# Patient Record
Sex: FEMALE | Race: WHITE | Employment: STUDENT | URBAN - METROPOLITAN AREA
[De-identification: names, ages, dates, MRNs, and addresses within clinical notes are randomized per-mention and may not be internally consistent; named-entity substitution may affect disease eponyms.]

---
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
## 2020-06-17 ENCOUNTER — OFFICE VISIT (OUTPATIENT)
Dept: INTERNAL MEDICINE CLINIC | Facility: CLINIC | Age: 20
End: 2020-06-17
Payer: MEDICAID

## 2020-06-17 VITALS
BODY MASS INDEX: 27.79 KG/M2 | HEART RATE: 97 BPM | WEIGHT: 151 LBS | SYSTOLIC BLOOD PRESSURE: 115 MMHG | TEMPERATURE: 97 F | HEIGHT: 62 IN | DIASTOLIC BLOOD PRESSURE: 80 MMHG

## 2020-06-17 DIAGNOSIS — Z00.00 PE (PHYSICAL EXAM), ROUTINE: Primary | ICD-10-CM

## 2020-06-17 DIAGNOSIS — N92.6 IRREGULAR PERIODS: ICD-10-CM

## 2020-06-17 DIAGNOSIS — Z30.011 INITIATION OF OCP (BCP): ICD-10-CM

## 2020-06-17 PROCEDURE — 99395 PREV VISIT EST AGE 18-39: CPT | Performed by: INTERNAL MEDICINE

## 2020-06-17 NOTE — PATIENT INSTRUCTIONS
Prevention Guidelines, Women Ages 25 to 44  Screening tests and vaccines are an important part of managing your health. A screening test is done to find possible disorders or diseases in people who don't have any symptoms.  The goal is to find a disease e diabetes Lifelong testing every 3 years   Type 2 diabetes All women with prediabetes Every year   Gonorrhea Sexually active women at increased risk for infection At routine exams   Hepatitis C Anyone at increased risk At routine exams   HIV All women kg Meningococcal Women at increased risk for infection should talk with their healthcare provider 1 or more doses   Pneumococcal conjugate vaccine (PCV13) and pneumococcal polysaccharide vaccine (PPSV23) Women at increased risk for infection should talk with instructions.

## 2020-06-17 NOTE — PROGRESS NOTES
Joaquin Mack is a 21year old female.   Patient presents with:  Physical      HPI:   Patient comes for physical exam   C/C physical  C/o has irregualr periods --jan and feb then not any until may and also last yr feb until dec abd the yr before that was muscles intact  NECK: supple,no adenopathy, nontender  LUNGS: clear to auscultation, no wheeze  CARDIO: RRR without murmur  GI: good BS's,no masses or tenderness  LMP - 5/20/2020   EXTREMITIES: no cyanosis, or edema    ASSESSMENT AND PLAN:   Diagnoses and

## 2021-05-10 DIAGNOSIS — N92.6 IRREGULAR PERIODS: ICD-10-CM

## 2021-05-10 RX ORDER — NORETHINDRONE ACETATE AND ETHINYL ESTRADIOL, ETHINYL ESTRADIOL AND FERROUS FUMARATE 1MG-10(24)
KIT ORAL
Qty: 84 TABLET | Refills: 0 | Status: SHIPPED | OUTPATIENT
Start: 2021-05-10 | End: 2021-08-06

## 2021-07-07 ENCOUNTER — OFFICE VISIT (OUTPATIENT)
Dept: INTERNAL MEDICINE CLINIC | Facility: CLINIC | Age: 21
End: 2021-07-07
Payer: MEDICAID

## 2021-07-07 VITALS
BODY MASS INDEX: 27.97 KG/M2 | SYSTOLIC BLOOD PRESSURE: 123 MMHG | HEIGHT: 62 IN | WEIGHT: 152 LBS | DIASTOLIC BLOOD PRESSURE: 79 MMHG | HEART RATE: 91 BPM

## 2021-07-07 DIAGNOSIS — Z01.419 ENCOUNTER FOR ROUTINE GYNECOLOGICAL EXAMINATION WITH PAPANICOLAOU SMEAR OF CERVIX: ICD-10-CM

## 2021-07-07 DIAGNOSIS — Z11.3 SCREENING EXAMINATION FOR STD (SEXUALLY TRANSMITTED DISEASE): ICD-10-CM

## 2021-07-07 DIAGNOSIS — Z00.00 PHYSICAL EXAM, ANNUAL: Primary | ICD-10-CM

## 2021-07-07 PROCEDURE — 99395 PREV VISIT EST AGE 18-39: CPT | Performed by: INTERNAL MEDICINE

## 2021-07-07 PROCEDURE — 3008F BODY MASS INDEX DOCD: CPT | Performed by: INTERNAL MEDICINE

## 2021-07-07 PROCEDURE — 3074F SYST BP LT 130 MM HG: CPT | Performed by: INTERNAL MEDICINE

## 2021-07-07 PROCEDURE — 3078F DIAST BP <80 MM HG: CPT | Performed by: INTERNAL MEDICINE

## 2021-07-07 NOTE — PROGRESS NOTES
Gerhardt Gander is a 24year old female.   Patient presents with:  Physical      HPI:   Pt comes for a physical   C/c physical   C/o periods are better after ocps   Took her MCAT            PMH  Irregular periods better on ocps      Lives with family   Goi murmur  GI: good BS's,no masses or tenderness  EXTREMITIES: no cyanosis, or edema    ASSESSMENT AND PLAN:   Diagnoses and all orders for this visit:    Physical exam, annual  -     COMP METABOLIC PANEL (14);  Future  -     ASSAY, THYROID STIM HORMONE; [employer]

## 2021-08-06 DIAGNOSIS — N92.6 IRREGULAR PERIODS: ICD-10-CM

## 2021-08-06 RX ORDER — NORETHINDRONE ACETATE AND ETHINYL ESTRADIOL, ETHINYL ESTRADIOL AND FERROUS FUMARATE 1MG-10(24)
1 KIT ORAL DAILY
Qty: 84 TABLET | Refills: 1 | Status: SHIPPED | OUTPATIENT
Start: 2021-08-06 | End: 2021-10-01

## 2021-08-06 NOTE — TELEPHONE ENCOUNTER
Please review; protocol failed/ no protocol.     Requested Prescriptions   Pending Prescriptions Disp Refills    LO LOESTRIN FE 1 MG-10 MCG / 10 MCG Oral Tab [Pharmacy Med Name: LO LOESTRIN FE TABLETS 28S] 84 tablet 0     Sig: TAKE 1 TABLET BY MOUTH DAILY

## 2021-10-01 ENCOUNTER — OFFICE VISIT (OUTPATIENT)
Dept: OBGYN CLINIC | Facility: CLINIC | Age: 21
End: 2021-10-01
Payer: MEDICAID

## 2021-10-01 VITALS
BODY MASS INDEX: 28.51 KG/M2 | SYSTOLIC BLOOD PRESSURE: 124 MMHG | WEIGHT: 151 LBS | HEART RATE: 125 BPM | HEIGHT: 61 IN | DIASTOLIC BLOOD PRESSURE: 81 MMHG

## 2021-10-01 DIAGNOSIS — N92.6 IRREGULAR PERIODS: ICD-10-CM

## 2021-10-01 DIAGNOSIS — Z11.3 SCREEN FOR STD (SEXUALLY TRANSMITTED DISEASE): ICD-10-CM

## 2021-10-01 DIAGNOSIS — Z01.419 WOMEN'S ANNUAL ROUTINE GYNECOLOGICAL EXAMINATION: Primary | ICD-10-CM

## 2021-10-01 DIAGNOSIS — Z12.4 PAPANICOLAOU SMEAR FOR CERVICAL CANCER SCREENING: ICD-10-CM

## 2021-10-01 DIAGNOSIS — Z30.41 SURVEILLANCE FOR BIRTH CONTROL, ORAL CONTRACEPTIVES: ICD-10-CM

## 2021-10-01 PROCEDURE — 99395 PREV VISIT EST AGE 18-39: CPT | Performed by: ADVANCED PRACTICE MIDWIFE

## 2021-10-01 PROCEDURE — 3074F SYST BP LT 130 MM HG: CPT | Performed by: ADVANCED PRACTICE MIDWIFE

## 2021-10-01 PROCEDURE — 3008F BODY MASS INDEX DOCD: CPT | Performed by: ADVANCED PRACTICE MIDWIFE

## 2021-10-01 PROCEDURE — 3079F DIAST BP 80-89 MM HG: CPT | Performed by: ADVANCED PRACTICE MIDWIFE

## 2021-10-01 RX ORDER — NORETHINDRONE ACETATE AND ETHINYL ESTRADIOL, ETHINYL ESTRADIOL AND FERROUS FUMARATE 1MG-10(24)
1 KIT ORAL DAILY
Qty: 84 TABLET | Refills: 4 | Status: SHIPPED | OUTPATIENT
Start: 2021-10-01

## 2021-10-01 NOTE — PROGRESS NOTES
Gladys Lema is a 24year old female. HPI:             Gladys Lema is a 24year old female. Candi Diaz Patient's last menstrual period was 09/15/2021 (approximate).     Patient presents with:  Gyn Exam: annual     Here for annual gyne exam. Recent Psychiatric/Behavioral: Negative. PHYSICAL EXAM:      10/01/21  0925   BP: 124/81   Pulse: (!) 125     Physical Exam  Constitutional:       General: She is not in acute distress. Appearance: Normal appearance. She is not ill-appearing.    Pulm 10/1/2021  Bishop Wheeler CNM

## 2022-07-27 ENCOUNTER — TELEPHONE (OUTPATIENT)
Dept: INTERNAL MEDICINE CLINIC | Facility: CLINIC | Age: 22
End: 2022-07-27

## 2022-07-27 NOTE — TELEPHONE ENCOUNTER
Received call from patient requesting a script for birth control that is less expensive then current birth control. Patient states she is between jobs and does not currently have insurance. Tried to use GoodRx, but that does not work for birth control. Please advise.

## 2022-07-28 NOTE — TELEPHONE ENCOUNTER
Patient contacted (name and  of patient verified). Dr. Eugenia Johnson recommendations reviewed. Patient verbalizes understanding. Offered to schedule an appointment with Dr. Trupti Cox, patient states her insurance will not start until , so she plans to call back and schedule an appointment once she has her work schedule and insurance information.

## 2022-08-31 DIAGNOSIS — N92.6 IRREGULAR PERIODS: ICD-10-CM

## 2022-08-31 RX ORDER — NORETHINDRONE ACETATE AND ETHINYL ESTRADIOL, ETHINYL ESTRADIOL AND FERROUS FUMARATE 1MG-10(24)
1 KIT ORAL DAILY
Qty: 84 TABLET | Refills: 4 | OUTPATIENT
Start: 2022-08-31

## 2022-08-31 NOTE — TELEPHONE ENCOUNTER
Please review; protocol failed/No Protcol    Requested Prescriptions   Pending Prescriptions Disp Refills    Norethin-Eth Estrad-Fe Biphas (LO LOESTRIN FE) 1 MG-10 MCG / 10 MCG Oral Tab 84 tablet 4     Sig: Take 1 tablet by mouth daily.         Gynecology Medication Protocol Failed - 8/31/2022 10:51 AM        Failed - Pass dependent on manual look-up of last PAP and patient compliance with PAP follow up recommendations        Passed - In person appointment or virtual visit in the past 12 mos or appointment in next 3 mos       Recent Outpatient Visits              11 months ago Women's annual routine gynecological examination    [facility], [facility], [facility]    Office Visit    1 year ago Physical exam, annual    [facility], [address] Miranda Costa MD    Office Visit    2 years ago PE (physical exam), routine    [facility], [facility] Miranda Costa MD    Office Visit    3 years ago PE (physical exam), routine    [facility], [facility], [place], MD    Office Visit    3 years ago Family history of [address] [facility] [address], [address] [facility], [facility] Visit     Future Appointments         Provider Department Appt Notes    In 4 weeks Esteban Varma MD [facility], [facility] px  \"policy informed\"                     Recent Outpatient Visits              11 months ago [employer] annual routine gynecological examination    [facility], [facility], [facility]    Office Visit    1 year ago Physical exam, annual    Miranda Delacruz MD    Office Visit    2 years ago PE (physical exam), routine    Gaby Gonzalez MD    Office Visit    3 years ago PE (physical exam), routine    [facility], [place], MD    Office Visit    3 years ago Family history of PCOS    [facility], [address] [place], [place]    Office Visit            Future Appointments         Provider Department Appt Notes    In 4 weeks Radha Rahman MD [facility], [facility], [address] [facility] [place] px  Nelli Kaur informed\"

## 2022-09-01 RX ORDER — NORETHINDRONE ACETATE AND ETHINYL ESTRADIOL, ETHINYL ESTRADIOL AND FERROUS FUMARATE 1MG-10(24)
1 KIT ORAL DAILY
Qty: 84 TABLET | Refills: 0 | Status: SHIPPED | OUTPATIENT
Start: 2022-09-01

## 2022-09-15 ENCOUNTER — OFFICE VISIT (OUTPATIENT)
Dept: INTERNAL MEDICINE CLINIC | Facility: CLINIC | Age: 22
End: 2022-09-15
Payer: COMMERCIAL

## 2022-09-15 VITALS
HEART RATE: 72 BPM | WEIGHT: 152.19 LBS | HEIGHT: 61 IN | SYSTOLIC BLOOD PRESSURE: 123 MMHG | RESPIRATION RATE: 16 BRPM | DIASTOLIC BLOOD PRESSURE: 80 MMHG | BODY MASS INDEX: 28.73 KG/M2

## 2022-09-15 DIAGNOSIS — Z00.00 PHYSICAL EXAM, ANNUAL: Primary | ICD-10-CM

## 2022-09-15 DIAGNOSIS — R01.1 SYSTOLIC MURMUR: ICD-10-CM

## 2022-09-15 PROCEDURE — 99395 PREV VISIT EST AGE 18-39: CPT | Performed by: INTERNAL MEDICINE

## 2022-09-15 PROCEDURE — 3074F SYST BP LT 130 MM HG: CPT | Performed by: INTERNAL MEDICINE

## 2022-09-15 PROCEDURE — 3079F DIAST BP 80-89 MM HG: CPT | Performed by: INTERNAL MEDICINE

## 2022-09-15 PROCEDURE — 3008F BODY MASS INDEX DOCD: CPT | Performed by: INTERNAL MEDICINE

## 2022-09-16 ENCOUNTER — LAB ENCOUNTER (OUTPATIENT)
Dept: LAB | Age: 22
End: 2022-09-16
Attending: INTERNAL MEDICINE

## 2022-09-16 LAB
ALBUMIN SERPL-MCNC: 3.9 G/DL (ref 3.4–5)
ALBUMIN/GLOB SERPL: 1.1 {RATIO} (ref 1–2)
ALP LIVER SERPL-CCNC: 71 U/L
ALT SERPL-CCNC: 19 U/L
ANION GAP SERPL CALC-SCNC: 7 MMOL/L (ref 0–18)
AST SERPL-CCNC: 10 U/L (ref 15–37)
BILIRUB SERPL-MCNC: 0.6 MG/DL (ref 0.1–2)
BUN BLD-MCNC: 19 MG/DL (ref 7–18)
BUN/CREAT SERPL: 26.4 (ref 10–20)
CALCIUM BLD-MCNC: 8.8 MG/DL (ref 8.5–10.1)
CHLORIDE SERPL-SCNC: 109 MMOL/L (ref 98–112)
CHOLEST SERPL-MCNC: 190 MG/DL (ref ?–200)
CO2 SERPL-SCNC: 20 MMOL/L (ref 21–32)
CREAT BLD-MCNC: 0.72 MG/DL
DEPRECATED RDW RBC AUTO: 37.6 FL (ref 35.1–46.3)
ERYTHROCYTE [DISTWIDTH] IN BLOOD BY AUTOMATED COUNT: 11.9 % (ref 11–15)
FASTING PATIENT LIPID ANSWER: YES
FASTING STATUS PATIENT QL REPORTED: YES
GFR SERPLBLD BASED ON 1.73 SQ M-ARVRAT: 121 ML/MIN/1.73M2 (ref 60–?)
GLOBULIN PLAS-MCNC: 3.6 G/DL (ref 2.8–4.4)
GLUCOSE BLD-MCNC: 81 MG/DL (ref 70–99)
HCT VFR BLD AUTO: 43.2 %
HDLC SERPL-MCNC: 39 MG/DL (ref 40–59)
HGB BLD-MCNC: 13.5 G/DL
LDLC SERPL CALC-MCNC: 131 MG/DL (ref ?–100)
MCH RBC QN AUTO: 27 PG (ref 26–34)
MCHC RBC AUTO-ENTMCNC: 31.3 G/DL (ref 31–37)
MCV RBC AUTO: 86.4 FL
NONHDLC SERPL-MCNC: 151 MG/DL (ref ?–130)
OSMOLALITY SERPL CALC.SUM OF ELEC: 283 MOSM/KG (ref 275–295)
PLATELET # BLD AUTO: 251 10(3)UL (ref 150–450)
POTASSIUM SERPL-SCNC: 3.9 MMOL/L (ref 3.5–5.1)
PROT SERPL-MCNC: 7.5 G/DL (ref 6.4–8.2)
RBC # BLD AUTO: 5 X10(6)UL
SODIUM SERPL-SCNC: 136 MMOL/L (ref 136–145)
TRIGL SERPL-MCNC: 111 MG/DL (ref 30–149)
TSI SER-ACNC: 2.31 MIU/ML (ref 0.36–3.74)
VLDLC SERPL CALC-MCNC: 20 MG/DL (ref 0–30)
WBC # BLD AUTO: 7.5 X10(3) UL (ref 4–11)

## 2022-09-16 PROCEDURE — 84443 ASSAY THYROID STIM HORMONE: CPT | Performed by: INTERNAL MEDICINE

## 2022-09-16 PROCEDURE — 85027 COMPLETE CBC AUTOMATED: CPT | Performed by: INTERNAL MEDICINE

## 2022-09-16 PROCEDURE — 36415 COLL VENOUS BLD VENIPUNCTURE: CPT | Performed by: INTERNAL MEDICINE

## 2022-09-16 PROCEDURE — 80053 COMPREHEN METABOLIC PANEL: CPT | Performed by: INTERNAL MEDICINE

## 2022-09-16 PROCEDURE — 80061 LIPID PANEL: CPT | Performed by: INTERNAL MEDICINE

## 2022-11-25 DIAGNOSIS — N92.6 IRREGULAR PERIODS: ICD-10-CM

## 2022-11-28 RX ORDER — NORETHINDRONE ACETATE AND ETHINYL ESTRADIOL, ETHINYL ESTRADIOL AND FERROUS FUMARATE 1MG-10(24)
1 KIT ORAL DAILY
Qty: 84 TABLET | Refills: 0 | Status: SHIPPED | OUTPATIENT
Start: 2022-11-28 | End: 2022-11-29

## 2022-11-29 RX ORDER — NORETHINDRONE ACETATE AND ETHINYL ESTRADIOL, ETHINYL ESTRADIOL AND FERROUS FUMARATE 1MG-10(24)
1 KIT ORAL DAILY
Qty: 84 TABLET | Refills: 3 | Status: SHIPPED | OUTPATIENT
Start: 2022-11-29

## 2023-10-02 ENCOUNTER — OFFICE VISIT (OUTPATIENT)
Dept: INTERNAL MEDICINE CLINIC | Facility: CLINIC | Age: 23
End: 2023-10-02

## 2023-10-02 VITALS
HEIGHT: 61 IN | SYSTOLIC BLOOD PRESSURE: 120 MMHG | RESPIRATION RATE: 18 BRPM | HEART RATE: 82 BPM | OXYGEN SATURATION: 98 % | WEIGHT: 171.19 LBS | DIASTOLIC BLOOD PRESSURE: 66 MMHG | BODY MASS INDEX: 32.32 KG/M2

## 2023-10-02 DIAGNOSIS — N92.6 IRREGULAR PERIODS: ICD-10-CM

## 2023-10-02 DIAGNOSIS — Z11.3 SCREENING EXAMINATION FOR STD (SEXUALLY TRANSMITTED DISEASE): ICD-10-CM

## 2023-10-02 DIAGNOSIS — Z00.00 PHYSICAL EXAM, ANNUAL: Primary | ICD-10-CM

## 2023-10-02 PROCEDURE — 3078F DIAST BP <80 MM HG: CPT | Performed by: INTERNAL MEDICINE

## 2023-10-02 PROCEDURE — 3074F SYST BP LT 130 MM HG: CPT | Performed by: INTERNAL MEDICINE

## 2023-10-02 PROCEDURE — 3008F BODY MASS INDEX DOCD: CPT | Performed by: INTERNAL MEDICINE

## 2023-10-02 PROCEDURE — 99395 PREV VISIT EST AGE 18-39: CPT | Performed by: INTERNAL MEDICINE

## 2023-10-02 RX ORDER — NORETHINDRONE ACETATE AND ETHINYL ESTRADIOL, ETHINYL ESTRADIOL AND FERROUS FUMARATE 1MG-10(24)
1 KIT ORAL DAILY
Qty: 84 TABLET | Refills: 3 | Status: SHIPPED | OUTPATIENT
Start: 2023-10-02

## 2023-11-06 ENCOUNTER — TELEPHONE (OUTPATIENT)
Dept: INTERNAL MEDICINE CLINIC | Facility: CLINIC | Age: 23
End: 2023-11-06

## 2023-11-06 NOTE — TELEPHONE ENCOUNTER
Current Outpatient Medications:       Norethin-Eth Estrad-Fe Biphas (LO LOESTRIN FE) 1 MG-10 MCG / 10 MCG Oral Tab, Take 1 tablet by mouth daily. , Disp: 84 tablet, Rfl: 3

## 2023-12-06 ENCOUNTER — LAB ENCOUNTER (OUTPATIENT)
Dept: LAB | Age: 23
End: 2023-12-06
Attending: INTERNAL MEDICINE
Payer: COMMERCIAL

## 2023-12-06 LAB
ALBUMIN SERPL-MCNC: 4.5 G/DL (ref 3.2–4.8)
ALBUMIN/GLOB SERPL: 1.7 {RATIO} (ref 1–2)
ALP LIVER SERPL-CCNC: 73 U/L
ALT SERPL-CCNC: 33 U/L
ANION GAP SERPL CALC-SCNC: 9 MMOL/L (ref 0–18)
AST SERPL-CCNC: 26 U/L (ref ?–34)
BILIRUB SERPL-MCNC: 0.5 MG/DL (ref 0.3–1.2)
BUN BLD-MCNC: 11 MG/DL (ref 9–23)
BUN/CREAT SERPL: 15.9 (ref 10–20)
CALCIUM BLD-MCNC: 9 MG/DL (ref 8.7–10.4)
CHLORIDE SERPL-SCNC: 108 MMOL/L (ref 98–112)
CHOLEST SERPL-MCNC: 186 MG/DL (ref ?–200)
CO2 SERPL-SCNC: 22 MMOL/L (ref 21–32)
CREAT BLD-MCNC: 0.69 MG/DL
DEPRECATED RDW RBC AUTO: 35.8 FL (ref 35.1–46.3)
EGFRCR SERPLBLD CKD-EPI 2021: 125 ML/MIN/1.73M2 (ref 60–?)
ERYTHROCYTE [DISTWIDTH] IN BLOOD BY AUTOMATED COUNT: 11.8 % (ref 11–15)
FASTING PATIENT LIPID ANSWER: YES
FASTING STATUS PATIENT QL REPORTED: YES
GLOBULIN PLAS-MCNC: 2.6 G/DL (ref 2.8–4.4)
GLUCOSE BLD-MCNC: 89 MG/DL (ref 70–99)
HCT VFR BLD AUTO: 41.4 %
HDLC SERPL-MCNC: 35 MG/DL (ref 40–59)
HGB BLD-MCNC: 13.5 G/DL
LDLC SERPL CALC-MCNC: 127 MG/DL (ref ?–100)
MCH RBC QN AUTO: 27.3 PG (ref 26–34)
MCHC RBC AUTO-ENTMCNC: 32.6 G/DL (ref 31–37)
MCV RBC AUTO: 83.8 FL
NONHDLC SERPL-MCNC: 151 MG/DL (ref ?–130)
OSMOLALITY SERPL CALC.SUM OF ELEC: 287 MOSM/KG (ref 275–295)
PLATELET # BLD AUTO: 260 10(3)UL (ref 150–450)
POTASSIUM SERPL-SCNC: 3.9 MMOL/L (ref 3.5–5.1)
PROT SERPL-MCNC: 7.1 G/DL (ref 5.7–8.2)
RBC # BLD AUTO: 4.94 X10(6)UL
SODIUM SERPL-SCNC: 139 MMOL/L (ref 136–145)
TRIGL SERPL-MCNC: 135 MG/DL (ref 30–149)
TSI SER-ACNC: 2.65 MIU/ML (ref 0.55–4.78)
VLDLC SERPL CALC-MCNC: 24 MG/DL (ref 0–30)
WBC # BLD AUTO: 7.7 X10(3) UL (ref 4–11)

## 2023-12-06 PROCEDURE — 85027 COMPLETE CBC AUTOMATED: CPT | Performed by: INTERNAL MEDICINE

## 2023-12-06 PROCEDURE — 36415 COLL VENOUS BLD VENIPUNCTURE: CPT | Performed by: INTERNAL MEDICINE

## 2023-12-06 PROCEDURE — 84443 ASSAY THYROID STIM HORMONE: CPT | Performed by: INTERNAL MEDICINE

## 2023-12-06 PROCEDURE — 80061 LIPID PANEL: CPT | Performed by: INTERNAL MEDICINE

## 2023-12-06 PROCEDURE — 80053 COMPREHEN METABOLIC PANEL: CPT | Performed by: INTERNAL MEDICINE

## 2023-12-06 PROCEDURE — 84439 ASSAY OF FREE THYROXINE: CPT | Performed by: INTERNAL MEDICINE

## 2023-12-07 ENCOUNTER — LAB ENCOUNTER (OUTPATIENT)
Dept: LAB | Age: 23
End: 2023-12-07
Attending: INTERNAL MEDICINE
Payer: COMMERCIAL

## 2023-12-07 ENCOUNTER — OFFICE VISIT (OUTPATIENT)
Dept: INTERNAL MEDICINE CLINIC | Facility: CLINIC | Age: 23
End: 2023-12-07
Payer: COMMERCIAL

## 2023-12-07 VITALS
RESPIRATION RATE: 18 BRPM | DIASTOLIC BLOOD PRESSURE: 79 MMHG | BODY MASS INDEX: 32.28 KG/M2 | HEIGHT: 61 IN | HEART RATE: 89 BPM | WEIGHT: 171 LBS | SYSTOLIC BLOOD PRESSURE: 120 MMHG

## 2023-12-07 DIAGNOSIS — L63.9 ALOPECIA AREATA: Primary | ICD-10-CM

## 2023-12-07 DIAGNOSIS — Z23 NEED FOR VACCINATION: ICD-10-CM

## 2023-12-07 DIAGNOSIS — Z11.3 SCREENING EXAMINATION FOR VENEREAL DISEASE: Primary | ICD-10-CM

## 2023-12-07 LAB — T4 FREE SERPL-MCNC: 1 NG/DL (ref 0.8–1.7)

## 2023-12-07 PROCEDURE — 90715 TDAP VACCINE 7 YRS/> IM: CPT | Performed by: INTERNAL MEDICINE

## 2023-12-07 PROCEDURE — 87591 N.GONORRHOEAE DNA AMP PROB: CPT

## 2023-12-07 PROCEDURE — 90471 IMMUNIZATION ADMIN: CPT | Performed by: INTERNAL MEDICINE

## 2023-12-07 PROCEDURE — 3008F BODY MASS INDEX DOCD: CPT | Performed by: INTERNAL MEDICINE

## 2023-12-07 PROCEDURE — 3078F DIAST BP <80 MM HG: CPT | Performed by: INTERNAL MEDICINE

## 2023-12-07 PROCEDURE — 3074F SYST BP LT 130 MM HG: CPT | Performed by: INTERNAL MEDICINE

## 2023-12-07 PROCEDURE — 99214 OFFICE O/P EST MOD 30 MIN: CPT | Performed by: INTERNAL MEDICINE

## 2023-12-07 PROCEDURE — 87491 CHLMYD TRACH DNA AMP PROBE: CPT

## 2023-12-08 LAB
C TRACH DNA SPEC QL NAA+PROBE: NEGATIVE
N GONORRHOEA DNA SPEC QL NAA+PROBE: NEGATIVE

## 2024-08-23 ENCOUNTER — NURSE TRIAGE (OUTPATIENT)
Dept: INTERNAL MEDICINE CLINIC | Facility: CLINIC | Age: 24
End: 2024-08-23

## 2024-08-23 ENCOUNTER — OFFICE VISIT (OUTPATIENT)
Dept: INTERNAL MEDICINE CLINIC | Facility: CLINIC | Age: 24
End: 2024-08-23
Payer: COMMERCIAL

## 2024-08-23 VITALS
BODY MASS INDEX: 32.66 KG/M2 | HEART RATE: 99 BPM | SYSTOLIC BLOOD PRESSURE: 125 MMHG | WEIGHT: 173 LBS | RESPIRATION RATE: 16 BRPM | HEIGHT: 61 IN | DIASTOLIC BLOOD PRESSURE: 85 MMHG

## 2024-08-23 DIAGNOSIS — E66.9 OBESITY (BMI 30.0-34.9): ICD-10-CM

## 2024-08-23 DIAGNOSIS — K92.1 BLOOD IN THE STOOL: Primary | ICD-10-CM

## 2024-08-23 DIAGNOSIS — R19.7 DIARRHEA, UNSPECIFIED TYPE: ICD-10-CM

## 2024-08-23 PROCEDURE — 99214 OFFICE O/P EST MOD 30 MIN: CPT | Performed by: NURSE PRACTITIONER

## 2024-08-23 NOTE — TELEPHONE ENCOUNTER
Action Requested: Summary for Provider     []  Critical Lab, Recommendations Needed  [] Need Additional Advice  []   FYI    []   Need Orders  [] Need Medications Sent to Pharmacy  [x]  Other     SUMMARY:   Verified name and .    Patient states that she had one instance of blood in stool yesterday. She states that the blood was bright red and was more than a couple of drops- states that she could see some blood at the bottom of the toilet bowl.    She denies any active bleeding now or bleeding since that one instance, denies any pain, fevers, dizziness, nausea, or any other symptoms at this time.    Per protocol, advised that patient be evaluated in office today.    Appointment scheduled:  Future Appointments   Date Time Provider Department Center   2024  9:20 AM Jade Jarrell APRN [facility]   10/3/2024  8:40 AM [facility]   10/7/2024  8:30 AM Lani Yuan CNM [id number]       Reason for call: Acute  Onset: Data Unavailable        Reason for Disposition   MODERATE rectal bleeding (small blood clots, passing blood without stool, or toilet water turns red)    Protocols used: Rectal Bleeding-A-OH

## 2024-08-23 NOTE — PATIENT INSTRUCTIONS
Add metamucil daily.     Do not strain when having a bowel movement.     Continue to drink plenty of water.

## 2024-08-23 NOTE — PROGRESS NOTES
Elisabet Mccall is a 24 year old female.  Chief Complaint   Patient presents with    Blood In Stool     Yesterday morning 1 time but no bleeding since       HPI:   She present with an episode of blood in the stool yesterday. She had BRBPR when wiping. She denies any constipation or straining. She denies any abdominal pain, nausea or vomiting.     She has been frequent diarrhea about twice week. She has had this for the past couple months.     She does drink enough water. She does not take a fiber supplement. She has been trying to eat healthy and lose weight.     She did have a BM today and it was normal. There was no blood when wiping.     Current Outpatient Medications   Medication Sig Dispense Refill    Norethin-Eth Estrad-Fe Biphas (LO LOESTRIN FE) 1 MG-10 MCG / 10 MCG Oral Tab Take 1 tablet by mouth daily. 84 tablet 3      History reviewed. No pertinent past medical history.   History reviewed. No pertinent surgical history.   Social History:  Social History     Socioeconomic History    Marital status: Single   Tobacco Use    Smoking status: Never    Smokeless tobacco: Never   Vaping Use    Vaping status: Never Used   Substance and Sexual Activity    Alcohol use: No    Drug use: No    Sexual activity: Never   Social History Narrative    School:        Grade:11th        Sports:Cheer          Family History   Problem Relation Age of Onset    High Blood Pressure Maternal Grandmother         LATE ONSET    High Cholesterol Maternal Grandmother     Diabetes Paternal Grandmother         TYPE II    Heart Disease Paternal Grandfather       No Known Allergies     REVIEW OF SYSTEMS:     Review of Systems   Constitutional:  Negative for fever.   HENT: Negative.     Respiratory:  Negative for cough, shortness of breath and wheezing.    Cardiovascular:  Negative for chest pain.   Gastrointestinal:  Positive for blood in stool and diarrhea. Negative for abdominal pain, constipation, nausea and vomiting.   Genitourinary:  Negative.    Musculoskeletal: Negative.    Skin: Negative.    Neurological: Negative.    Psychiatric/Behavioral: Negative.        Wt Readings from Last 5 Encounters:   08/23/24 173 lb (78.5 kg)   12/07/23 171 lb (77.6 kg)   10/02/23 171 lb 3.2 oz (77.7 kg)   09/15/22 152 lb 3.2 oz (69 kg)   10/01/21 151 lb (68.5 kg)     Body mass index is 32.69 kg/m².      EXAM:   /85   Pulse 99   Resp 16   Ht 5' 1\" (1.549 m)   Wt 173 lb (78.5 kg)   LMP 09/11/2023 (Approximate)   BMI 32.69 kg/m²     Physical Exam  Vitals reviewed.   Constitutional:       Appearance: Normal appearance.   HENT:      Head: Normocephalic.   Cardiovascular:      Rate and Rhythm: Normal rate and regular rhythm.      Pulses: Normal pulses.   Pulmonary:      Breath sounds: Normal breath sounds. No wheezing.   Abdominal:      General: Bowel sounds are normal.      Palpations: Abdomen is soft.      Tenderness: There is no abdominal tenderness.   Musculoskeletal:         General: No swelling. Normal range of motion.   Skin:     General: Skin is warm and dry.   Neurological:      Mental Status: She is alert and oriented to person, place, and time.   Psychiatric:         Mood and Affect: Mood normal.         Behavior: Behavior normal.            ASSESSMENT AND PLAN:   1. Blood in the stool  - could be related to hemorrhoids  - check stool test and blood work   - add daily fiber supplement   - avoid straining   - CBC With Differential With Platelet  - Occult Blood, Fecal, Immunoassay (Blue cards) [E]; Future    2. Diarrhea, unspecified type  - add Metamucil daily     3. Obesity (BMI 30.0-34.9)   - patient is attempting to monitor her diet and exercise as tolerated.     Total time spent with patient and reviewing the chart: 30 minutes     The patient indicates understanding of these issues and agrees to the plan.  Return for if symptoms do not resolve.

## 2024-08-24 LAB
ABSOLUTE BASOPHILS: 50 CELLS/UL (ref 0–200)
ABSOLUTE EOSINOPHILS: 100 CELLS/UL (ref 15–500)
ABSOLUTE LYMPHOCYTES: 3137 CELLS/UL (ref 850–3900)
ABSOLUTE MONOCYTES: 681 CELLS/UL (ref 200–950)
ABSOLUTE NEUTROPHILS: 4333 CELLS/UL (ref 1500–7800)
BASOPHILS: 0.6 %
EOSINOPHILS: 1.2 %
HEMATOCRIT: 45.4 % (ref 35–45)
HEMOGLOBIN: 14.4 G/DL (ref 11.7–15.5)
LYMPHOCYTES: 37.8 %
MCH: 27.4 PG (ref 27–33)
MCHC: 31.7 G/DL (ref 32–36)
MCV: 86.5 FL (ref 80–100)
MONOCYTES: 8.2 %
MPV: 12.2 FL (ref 7.5–12.5)
NEUTROPHILS: 52.2 %
PLATELET COUNT: 253 THOUSAND/UL (ref 140–400)
RDW: 12.4 % (ref 11–15)
RED BLOOD CELL COUNT: 5.25 MILLION/UL (ref 3.8–5.1)
WHITE BLOOD CELL COUNT: 8.3 THOUSAND/UL (ref 3.8–10.8)

## 2024-09-19 DIAGNOSIS — N92.6 IRREGULAR PERIODS: ICD-10-CM

## 2024-09-19 RX ORDER — NORETHINDRONE ACETATE AND ETHINYL ESTRADIOL, ETHINYL ESTRADIOL AND FERROUS FUMARATE 1MG-10(24)
1 KIT ORAL DAILY
Qty: 84 TABLET | Refills: 3 | OUTPATIENT
Start: 2024-09-19

## 2024-09-19 NOTE — TELEPHONE ENCOUNTER
Please review;  [facility] protocol failed/ No protocol     Last PAP was 10/8/2021  ( normal )     Requested Prescriptions   Pending Prescriptions Disp Refills    LO LOESTRIN FE 1 MG-10 MCG / 10 MCG Oral Tab [Pharmacy Med Name: LO LOESTRIN FE TABLETS 28S] 84 tablet 3     Sig: Take 1 tablet by mouth daily.       Gynecology Medication Protocol Failed - 9/19/2024  8:19 AM        Failed - PASS-PENDING LAST PAP WNL--VIA MANUAL LOOKUP        Passed - Physical or Pelvic/Breast in past 12 or next 3 mos--VIA MANUAL LOOKUP           Future Appointments         Provider Department Appt Notes    In 2 weeks Vicky Galo MD [facility] px    In 2 weeks [facility]          Recent Outpatient Visits              3 weeks ago Blood in the stool    [facility] Jade Jarrell APRN    Office Visit    9 months ago [facility]    [facility] Visit    11 months ago [facility] exam, annual    [facility]    Office Visit    2 years ago Physical exam, annual    [facility] Vicky Goldberg MD    Office Visit    2 years ago Women's annual routine gynecological examination    [facility] Lani Yuan CNM    Office Visit

## 2024-10-03 ENCOUNTER — OFFICE VISIT (OUTPATIENT)
Dept: INTERNAL MEDICINE CLINIC | Facility: CLINIC | Age: 24
End: 2024-10-03
Payer: COMMERCIAL

## 2024-10-03 ENCOUNTER — TELEPHONE (OUTPATIENT)
Dept: INTERNAL MEDICINE CLINIC | Facility: CLINIC | Age: 24
End: 2024-10-03

## 2024-10-03 VITALS
WEIGHT: 177.38 LBS | SYSTOLIC BLOOD PRESSURE: 132 MMHG | DIASTOLIC BLOOD PRESSURE: 85 MMHG | BODY MASS INDEX: 33.49 KG/M2 | HEART RATE: 82 BPM | HEIGHT: 61 IN

## 2024-10-03 DIAGNOSIS — E66.09 CLASS 1 OBESITY DUE TO EXCESS CALORIES WITHOUT SERIOUS COMORBIDITY WITH BODY MASS INDEX (BMI) OF 33.0 TO 33.9 IN ADULT: ICD-10-CM

## 2024-10-03 DIAGNOSIS — Z00.00 PHYSICAL EXAM, ANNUAL: Primary | ICD-10-CM

## 2024-10-03 DIAGNOSIS — E55.9 VITAMIN D DEFICIENCY: ICD-10-CM

## 2024-10-03 DIAGNOSIS — Z23 NEED FOR VACCINATION: ICD-10-CM

## 2024-10-03 DIAGNOSIS — K62.5 BRBPR (BRIGHT RED BLOOD PER RECTUM): ICD-10-CM

## 2024-10-03 DIAGNOSIS — E66.811 CLASS 1 OBESITY DUE TO EXCESS CALORIES WITHOUT SERIOUS COMORBIDITY WITH BODY MASS INDEX (BMI) OF 33.0 TO 33.9 IN ADULT: ICD-10-CM

## 2024-10-03 PROCEDURE — 99395 PREV VISIT EST AGE 18-39: CPT | Performed by: INTERNAL MEDICINE

## 2024-10-03 PROCEDURE — 90471 IMMUNIZATION ADMIN: CPT | Performed by: INTERNAL MEDICINE

## 2024-10-03 PROCEDURE — 90656 IIV3 VACC NO PRSV 0.5 ML IM: CPT | Performed by: INTERNAL MEDICINE

## 2024-10-03 PROCEDURE — 99214 OFFICE O/P EST MOD 30 MIN: CPT | Performed by: INTERNAL MEDICINE

## 2024-10-03 RX ORDER — ACETAMINOPHEN AND CODEINE PHOSPHATE 300; 30 MG/1; MG/1
1 TABLET ORAL EVERY 4 HOURS PRN
COMMUNITY
Start: 2024-09-13 | End: 2024-10-03 | Stop reason: ALTCHOICE

## 2024-10-03 RX ORDER — TRETINOIN 0.25 MG/G
CREAM TOPICAL
COMMUNITY
Start: 2024-09-04

## 2024-10-03 RX ORDER — AMOXICILLIN 875 MG
TABLET ORAL
COMMUNITY
Start: 2024-09-13 | End: 2024-10-03 | Stop reason: ALTCHOICE

## 2024-10-03 NOTE — TELEPHONE ENCOUNTER
Note from payer: ePA request already received at 2024-10-03 08:58:17.86 with epa ref id [id number]

## 2024-10-03 NOTE — TELEPHONE ENCOUNTER
Medication Being Authorized    semaglutide-weight management 0.25 MG/0.5ML Subcutaneous Solution Auto-injector  Inject 0.5 mL (0.25 mg total) into the skin once a week for 4 doses.  Dispense: 2 mL Refills: 0   Start: 10/3/2024 End: 10/25/2024   Class: Normal Diagnoses: Class 1 obesity due to excess calories without serious comorbidity with body mass index (BMI) of 33.0 to 33.9 in adult   This order has not been released to its destination.  To be filled at: [id number] - [place], [place] - [address] AT [facility], [phone], [phone]

## 2024-10-03 NOTE — PROGRESS NOTES
Elisabet Mccall is a 24 year old female.  Chief Complaint   Patient presents with    Physical    Medication Follow-Up     Contraceptive        HPI:   Patient comes for her annual physical  C/C annual physical  C/o in August patient had 1 episode of bright red blood per rectum, she was at work when she felt like she needed to use the bathroom and had normal stool, but she smelled something different and when she looked in the bowl there was some blood like a little puddle on the bottom of the toilet bowl.  She was not on her   menstrual cycle and she was not constipated.  Just that 1 episode and she did not and has not had any further episodes  She does not feel any hemorrhoids  Would like to start a glp1 for wt loss bc she changed all whole diet -eating organic, cut out a lot of bad foods working out 4 to 5 days a week and still gaining wt           PMH  Irregular periods better on ocps      Lives with family   Mom nurse at [employer] ,  Going to  of [employer]   , graduated from [employer]   Sexually active with one male partner     Current Outpatient Medications   Medication Sig Dispense Refill    tretinoin 0.025 % External Cream APPLY PEA-SIZED AMOUNT TO THE FACE EVERY NIGHT AT BEDTIME      semaglutide-weight management 0.25 MG/0.5ML Subcutaneous Solution Auto-injector Inject 0.5 mL (0.25 mg total) into the skin once a week for 4 doses. 2 mL 0    Norethin-Eth Estrad-Fe Biphas (LO LOESTRIN FE) 1 MG-10 MCG / 10 MCG Oral Tab Take 1 tablet by mouth daily. 84 tablet 3      No past medical history on file.   No past surgical history on file.   Social History:  Social History     Socioeconomic History    Marital status: Single   Tobacco Use    Smoking status: Never    Smokeless tobacco: Never   Vaping Use    Vaping status: Never Used   Substance and Sexual Activity    Alcohol use: No    Drug use: No    Sexual activity: Never   Social History Narrative    School:        Grade:11th        Sports:Cheer            REVIEW OF  SYSTEMS:   GENERAL HEALTH: No fevers, chills, sweats, fatigue  VISION: No recent vision problems, blurry vision or double vision  HEENT: No decreased hearing ear pain nasal congestion or sore throat  SKIN: denies any unusual skin lesions or rashes  RESPIRATORY: denies shortness of breath, cough, wheezing  CARDIOVASCULAR: denies chest pain on exertion, palpitations, swelling in feet  GI: denies abdominal pain and denies heartburn, nausea or vomiting  : No Pain on urination, change in the color of urine, discharge, urinating frequently  MUS: No back pain, joint pain, muscle pain  NEURO: denies headaches , anxiety, depression    EXAM:   /85   Pulse 82   Ht 5' 1\" (1.549 m)   Wt 177 lb 6.4 oz (80.5 kg)   BMI 33.52 kg/m²   GENERAL: well developed, well nourished,in no apparent distress   SKIN: no rashes,no suspicious lesions  HEENT: atraumatic, normocephalic,ears and throat are clear, no frontal or maxillary sinus tenderness, pupils equal reactive to light bilaterally, extract muscles intact  NECK: supple,no adenopathy,nontender   LUNGS: clear to auscultation, no wheeze  CARDIO: RRR without murmur  GI: good BS's,no masses or tenderness  EXTREMITIES: no cyanosis, or edema    ASSESSMENT AND PLAN:   Diagnoses and all orders for this visit:    Physical exam, annual  -     Comp Metabolic Panel (14)  -     Lipid Panel  -     TSH W Reflex To Free T4  -     CBC, Platelet; No Differential  -     VITAMIN D, SCREEN [[id number]][Q]    Need for vaccination  -     Fluzone trivalent vaccine, PF 0.5mL, 6mo+ (35813)    BRBPR (bright red blood per rectum)  -     GASTRO - INTERNAL    Vitamin D deficiency  -     VITAMIN D, SCREEN [[id number]][Q]    Class 1 obesity due to excess calories without serious comorbidity with body mass index (BMI) of 33.0 to 33.9 in adult  -     semaglutide-weight management 0.25 MG/0.5ML Subcutaneous Solution Auto-injector; Inject 0.5 mL (0.25 mg total) into the skin once a week for 4 doses.  -     [facility] [place] [facility]      Advised patient to watch what she eats exercise, seatbelt use no texting driving, sunscreen use advised  Continue to watch what she eats and exercise and will place referral for [facility] but she would like a \"kick start\" she is very frustrated with the  lack of weight loss-did advis e pt to be patient and consistent - started wegovy and pt to come back in 1-2 mns for wt check     Preventive medicine  Labs 12/2023   Pap - 10/2021 and due in 10/2024 - sees midwife , has appointment  next week      The patient indicates understanding of these issues and agrees to the plan.  No follow-ups on file.

## 2024-10-07 ENCOUNTER — OFFICE VISIT (OUTPATIENT)
Dept: OBGYN CLINIC | Facility: CLINIC | Age: 24
End: 2024-10-07
Payer: COMMERCIAL

## 2024-10-07 VITALS
HEIGHT: 61 IN | SYSTOLIC BLOOD PRESSURE: 121 MMHG | WEIGHT: 177 LBS | BODY MASS INDEX: 33.42 KG/M2 | HEART RATE: 91 BPM | DIASTOLIC BLOOD PRESSURE: 83 MMHG

## 2024-10-07 DIAGNOSIS — Z01.419 WOMEN'S ANNUAL ROUTINE GYNECOLOGICAL EXAMINATION: Primary | ICD-10-CM

## 2024-10-07 DIAGNOSIS — Z11.3 SCREEN FOR STD (SEXUALLY TRANSMITTED DISEASE): ICD-10-CM

## 2024-10-07 DIAGNOSIS — Z30.41 SURVEILLANCE FOR BIRTH CONTROL, ORAL CONTRACEPTIVES: ICD-10-CM

## 2024-10-07 DIAGNOSIS — Z12.4 PAPANICOLAOU SMEAR FOR CERVICAL CANCER SCREENING: ICD-10-CM

## 2024-10-07 RX ORDER — NORETHINDRONE ACETATE AND ETHINYL ESTRADIOL, ETHINYL ESTRADIOL AND FERROUS FUMARATE 1MG-10(24)
1 KIT ORAL DAILY
Qty: 84 TABLET | Refills: 3 | Status: SHIPPED | OUTPATIENT
Start: 2024-10-07

## 2024-10-07 NOTE — PROGRESS NOTES
Elisabet Mccall is a 24 year old female.    HPI:       Elisabet Mccall is a 24 year old female.   Patient's last menstrual period was 2024.    Chief Complaint   Patient presents with    Gyn Exam     Annual exam     Saw PCP recently and was started on Wegovy. Out of stock so has not started yet.     Denies abnormal discharge or vaginal irritation.      Hx Prior Abnormal Pap: No  OCP's for birth control. Happy with method. Denies side effects.     Current partner x 8 years. Monogamous relationship.   Feels safe. Denies abuse  GC/CT from pap today    Last pap: 10/2021, NIL. Hx of abnormal pap No    Diet: well balanced  Exercise: regular    Denies excessive ETOH use, no marijuana, smoking, vapping or any non prescriptive drug use.     Family hx of breast or ovarian CA: No    HPV Vaccine:  completed    Note: This is a gyn only visit. Pt  is referred back to PCP for care of any non-gyn concerns listed above and  in the Problem List.      PHQ-2 SCORE: 0  , done 10/7/2024   Last [place] Suicide Screening on 10/7/2024 was No Risk.       Depression Screening (PHQ-2/PHQ-9): Over the LAST 2 WEEKS   Little interest or pleasure in doing things (over the last two weeks)?: Not at all  Little interest or pleasure in doing things: Not at all    Feeling down, depressed, or hopeless (over the last two weeks)?: Not at all  Feeling down, depressed, or hopeless: Not at all    PHQ-2 SCORE: 0  PHQ-2 SCORE: 0           HISTORY:  History reviewed. No pertinent past medical history.   History reviewed. No pertinent surgical history.   Family History   Problem Relation Age of Onset    High Blood Pressure Maternal Grandmother         LATE ONSET    High Cholesterol Maternal Grandmother     Diabetes Paternal Grandmother         TYPE II    Heart Disease Paternal Grandfather       Social History:   Social History     Socioeconomic History    Marital status: Single   Tobacco Use    Smoking status: Never    Smokeless tobacco: Never    Vaping Use    Vaping status: Never Used   Substance and Sexual Activity    Alcohol use: No    Drug use: No    Sexual activity: Never   Social History Narrative    School:        Grade:11th        Sports:Cheer            OB History    Para Term  AB Living   0 0 0 0 0 0   SAB IAB Ectopic Multiple Live Births   0 0 0 0 0       Medications (Active prior to today's visit):  Current Outpatient Medications   Medication Sig Dispense Refill    Norethin-Eth Estrad-Fe Biphas (LO LOESTRIN FE) 1 MG-10 MCG / 10 MCG Oral Tab Take 1 tablet by mouth daily. 84 tablet 3    tretinoin 0.025 % External Cream APPLY PEA-SIZED AMOUNT TO THE FACE EVERY NIGHT AT BEDTIME      semaglutide-weight management 0.25 MG/0.5ML Subcutaneous Solution Auto-injector Inject 0.5 mL (0.25 mg total) into the skin once a week for 4 doses. 2 mL 0       Allergies:  No Known Allergies      ROS:     Review of Systems   Constitutional: Negative.    Respiratory: Negative.     Cardiovascular: Negative.    Gastrointestinal: Negative.    Genitourinary: Negative.    Neurological: Negative.    Psychiatric/Behavioral: Negative.           PHYSICAL EXAM:     Vitals:    10/07/24 0831   BP: 121/83   Pulse: 91     Physical Exam  Constitutional:       General: She is not in acute distress.     Appearance: Normal appearance. She is not ill-appearing.   HENT:      Head: Normocephalic.   Pulmonary:      Effort: Pulmonary effort is normal.   Abdominal:      General: Abdomen is flat.      Palpations: Abdomen is soft.      Tenderness: There is no abdominal tenderness.   Genitourinary:     General: Normal vulva.      Labia:         Right: No rash, tenderness, lesion or injury.         Left: No rash, tenderness, lesion or injury.       Urethra: No prolapse, urethral pain, urethral swelling or urethral lesion.      Vagina: No signs of injury and foreign body. No vaginal discharge, erythema, tenderness, bleeding, lesions or prolapsed vaginal walls.      Cervix: No  cervical motion tenderness, discharge, lesion, erythema, cervical bleeding or eversion. Friability: spotting with pap.  Musculoskeletal:         General: Normal range of motion.   Skin:     General: Skin is warm and dry.   Neurological:      Mental Status: She is alert and oriented to person, place, and time.   Psychiatric:         Mood and Affect: Mood normal.         Behavior: Behavior normal.         Thought Content: Thought content normal.          ASSESSMENT/PLAN:      Diagnoses and all orders for this visit:    Women's annual routine gynecological examination  -     ThinPrep PAP Smear B; Future    Papanicolaou smear for cervical cancer screening    Screen for STD (sexually transmitted disease)  -     Chlamydia/Gc Amplification    Surveillance for birth control, oral contraceptives  -     Norethin-Eth Estrad-Fe Biphas (LO LOESTRIN FE) 1 MG-10 MCG / 10 MCG Oral Tab; Take 1 tablet by mouth daily.        Normal gyne exam. Pap with HPV to lab/Pap UTD, pap guidelines reviewed  STD testing: GC/CT from pap    Discussed Multivit with Folic acid and Vitamin D supplementation  Discussed weight management and exercise. Plans to start Wegovy   Flu shot completed  Refill on OCP's. Condoms for STI prevention    F/u 1 year or prn              10/7/2024  Lani Yuan CNM

## 2024-10-09 LAB
CHLAMYDIA TRACHOMATIS$RNA, TMA: NOT DETECTED
NEISSERIA GONORRHOEAE$RNA, TMA: NOT DETECTED

## 2024-10-14 ENCOUNTER — PATIENT MESSAGE (OUTPATIENT)
Dept: INTERNAL MEDICINE CLINIC | Facility: CLINIC | Age: 24
End: 2024-10-14

## 2024-10-14 DIAGNOSIS — E66.09 CLASS 1 OBESITY DUE TO EXCESS CALORIES WITH BODY MASS INDEX (BMI) OF 33.0 TO 33.9 IN ADULT, UNSPECIFIED WHETHER SERIOUS COMORBIDITY PRESENT: Primary | ICD-10-CM

## 2024-10-14 DIAGNOSIS — E66.811 CLASS 1 OBESITY DUE TO EXCESS CALORIES WITH BODY MASS INDEX (BMI) OF 33.0 TO 33.9 IN ADULT, UNSPECIFIED WHETHER SERIOUS COMORBIDITY PRESENT: Primary | ICD-10-CM

## 2024-10-19 LAB
ALBUMIN/GLOBULIN RATIO: 1.6 (CALC) (ref 1–2.5)
ALBUMIN: 4.5 G/DL (ref 3.6–5.1)
ALKALINE PHOSPHATASE: 89 U/L (ref 31–125)
ALT: 43 U/L (ref 6–29)
AST: 24 U/L (ref 10–30)
BILIRUBIN, TOTAL: 0.5 MG/DL (ref 0.2–1.2)
BUN: 14 MG/DL (ref 7–25)
CALCIUM: 9.5 MG/DL (ref 8.6–10.2)
CARBON DIOXIDE: 24 MMOL/L (ref 20–32)
CHLORIDE: 105 MMOL/L (ref 98–110)
CHOL/HDLC RATIO: 4.9 (CALC)
CHOLESTEROL, TOTAL: 219 MG/DL
CREATININE: 0.67 MG/DL (ref 0.5–0.96)
EGFR: 125 ML/MIN/1.73M2
GLOBULIN: 2.8 G/DL (CALC) (ref 1.9–3.7)
GLUCOSE: 87 MG/DL (ref 65–99)
HDL CHOLESTEROL: 45 MG/DL
HEMATOCRIT: 43.2 % (ref 35–45)
HEMOGLOBIN: 14 G/DL (ref 11.7–15.5)
LDL-CHOLESTEROL: 145 MG/DL (CALC)
MCH: 27.6 PG (ref 27–33)
MCHC: 32.4 G/DL (ref 32–36)
MCV: 85.2 FL (ref 80–100)
MPV: 12.2 FL (ref 7.5–12.5)
NON-HDL CHOLESTEROL: 174 MG/DL (CALC)
PLATELET COUNT: 236 THOUSAND/UL (ref 140–400)
POTASSIUM: 4.1 MMOL/L (ref 3.5–5.3)
PROTEIN, TOTAL: 7.3 G/DL (ref 6.1–8.1)
RDW: 12.1 % (ref 11–15)
RED BLOOD CELL COUNT: 5.07 MILLION/UL (ref 3.8–5.1)
SODIUM: 139 MMOL/L (ref 135–146)
TRIGLYCERIDES: 158 MG/DL
TSH W/REFLEX TO FT4: 2.62 MIU/L
VITAMIN D, 25-OH, TOTAL: 23 NG/ML (ref 30–100)
WHITE BLOOD CELL COUNT: 8.4 THOUSAND/UL (ref 3.8–10.8)

## 2024-10-28 RX ORDER — ERGOCALCIFEROL 1.25 MG/1
50000 CAPSULE, LIQUID FILLED ORAL WEEKLY
Qty: 4 CAPSULE | Refills: 3 | Status: SHIPPED | OUTPATIENT
Start: 2024-10-28

## 2024-11-18 ENCOUNTER — PATIENT MESSAGE (OUTPATIENT)
Dept: INTERNAL MEDICINE CLINIC | Facility: CLINIC | Age: 24
End: 2024-11-18

## 2024-11-18 DIAGNOSIS — Z11.1 SCREENING FOR TUBERCULOSIS: ICD-10-CM

## 2024-11-18 DIAGNOSIS — Z01.84 IMMUNITY TO MEASLES, MUMPS, AND RUBELLA DETERMINED BY SEROLOGIC TEST: ICD-10-CM

## 2024-11-18 DIAGNOSIS — Z01.84: ICD-10-CM

## 2024-11-18 DIAGNOSIS — Z01.84 IMMUNITY TO HEPATITIS B VIRUS DEMONSTRATED BY SEROLOGIC TEST: Primary | ICD-10-CM

## 2024-11-18 DIAGNOSIS — Z01.84 IMMUNITY TO VARICELLA DETERMINED BY SEROLOGIC TEST: ICD-10-CM

## 2024-11-19 NOTE — TELEPHONE ENCOUNTER
I have faxed report successfully to number below   Called pt no answer, left message to call back with questions

## 2024-12-02 NOTE — TELEPHONE ENCOUNTER
Printed form for signature , will be given to provider for signature    Lab order message already routed to provider

## 2024-12-02 NOTE — TELEPHONE ENCOUNTER
Dr Galo, Please see patient's [employer] message requesting lab orders for school physical.    Tong ALVARENGA Clinical Staff: Please assist with  printing  attached form from [employer] message.

## 2024-12-04 ENCOUNTER — MED REC SCAN ONLY (OUTPATIENT)
Dept: INTERNAL MEDICINE CLINIC | Facility: CLINIC | Age: 24
End: 2024-12-04

## 2024-12-04 NOTE — TELEPHONE ENCOUNTER
Called pt verified name and   Informed labs and form completed   Will  at office  Form sent top scan - printed labs order for pt

## 2024-12-04 NOTE — TELEPHONE ENCOUNTER
I am not sure how to check the immunity to polio--will ask ID if we check titers and if we vaccinate adults   All titers ordered, anything else ?

## 2024-12-04 NOTE — TELEPHONE ENCOUNTER
Ordered titers for polio    She can get this done at the [facility]    Can get all labs sajan   Signed form

## 2024-12-11 LAB
MEASLES ANTIBODY (IGG): 209 AU/ML
MITOGEN-NIL: 7.25 IU/ML
MUMPS VIRUS$ANTIBODY (IGG): 101 AU/ML
NIL: 0.02 IU/ML
QUANTIFERON(R)-TB GOLD PLUS, 1 TUBE: NEGATIVE
RUBELLA ANTIBODY (IGG): 2.55 INDEX
TB1-NIL: 0.02 IU/ML
TB2-NIL: 0.01 IU/ML
VARICELLA ZOSTER VIRUS$AB (IGG): 6.62 S/CO

## (undated) NOTE — MR AVS SNAPSHOT
[facility]  [facility], [address]  [phone]               Thank you for choosing us for your health care visit with JOO BARCENAS DO.   We are glad to serve you and happy to provide you with this summary and how they spend time together. Peer pressure can be a problem among teenagers. · Life at home. How is your child’s behavior? Does he or she get along with others in the family? Is he or she respectful of you, other adults, and authority?  Does your chil · Get at least 30 minutes to 60 minutes of physical activity every day. This time can be broken up throughout the day.  After-school sports, dance or martial arts classes, riding a bike, or even walking to school or a friend’s house counts as activity.    · and a checkup. · Remind your teen to brush and floss his or her teeth before bed. Sleeping tips  During the teen years, sleep patterns may change. Many teenagers have a hard time falling asleep, which can lead to sleeping late the next morning.  Here are text or talk on a cell phone while driving. (And don’t do this yourself! Remember, you set an example.)  · Set rules and limits around driving and use of the car. If your teen gets a ticket or has an accident, there should be consequences.  Driving is a dannielle Date Last Reviewed: 10/2/2014  © 8036-1986 [address], [address] [place], [place] [address]. All rights reserved. This information is not intended as a substitute for professional medical care.  Always follow your healthcare professional 4. Do not over-apply! A small amount goes a long way. A pea-sized amount applied equally over the entire face is best.  5. Do not use retinoids for “spot” therapy.   Topical antibiotics  · Benzoyl peroxide – fantastic topical antibiotic (available without p - Avoid using media as the only way to calm a child  - Discourage entertainment media while children are doing homework  - Keep mealtimes a family time, they should be kept media free  - Discontinue any media or screen time at least an hour before bed.  Do to next step when asymptomatic for at least 24 hours  Step 6 (No restrictions):  Full clearance for game play  NOTE: If at any point the athlete has recurrence of post-concussive symptoms during these steps, he or she should drop back a step to activities t

## (undated) NOTE — LETTER
VACCINE ADMINISTRATION RECORD  PARENT / GUARDIAN APPROVAL  Date: 2018  Vaccine administered to: Wagner Benson     : 2000    MRN: [id number]    A copy of the appropriate Centers for Disease Control and Prevention Vaccine Information statement

## (undated) NOTE — Clinical Note
Name:  Ian Barrington Year:  12th Grade Class: Student ID No.:   [facility]  [address] [place], [place] [id number] Phone:  [phone] (home)  :  16year old   Name Relationship [address] Work Phone Home Phone Mobile Phone   1.  MIRIAM DANIELS 15. Does anyone in your family have hypertrophic cardiomyopathy, Marfan syndrome, arrhythmogenic right ventricular cardiomyopathy, long QT syndrome, short QT syndrome, Brugada syndrome, or catecholaminergic polymorphic ventricular tachycardia?      15. Does 35. Have you ever had a hit or blow to the head that caused confusion, prolonged headache, or memory problems? 36. Do you have a history of seizure disorder?     37. Do you have headaches with exercise?      38. Have you ever had numbness, tingling, or Appearance:  Marfan stigmata (kyphoscoliosis, high-arched palate, pectus excavatum,      arachnodactyly, arm span > height, hyperlaxity, myopia, MVP, aortic insufficiency) Yes    Eyes/Ears/Nose/Throat:  Pupils equal    Hearing Yes    Lymph nodes Yes    Hea defined in the [facility] Performance-Enhancing Substance Testing Program Protocol.  We have reviewed the policy and understand that I/our student may be asked to submit to testing for the presence of performance-enhancing substances in my/his/her body either dur

## (undated) NOTE — LETTER
12/9/2024              Elisabet Mccall        [address] [place] [address]         Dear Elisabet     Influenza Vaccine, trivalent (IIV3), PF 0.5mL (42511)    Linked Order: [id number]   Current Status    Updated on: 10/3/2024  8:35 AM    Name Date Status Dose VIS Date Route Site  Lot# Given By Verified By   Influenza Vaccine, trivalent (IIV3), PF 0.5mL ([id number]) 10/3/2024 Given 0.5 mL 08/06/2021 INTRAMUSCULA LEFT ARM SANOFI PASTEUR [id number] Jose Alberto Saldana CMA --   Exp. Date NDC # Product Time Location External Inventory Class Comment   6/30/2025 61234-597-58 Fluzone -- -- -- General --   Question Answer Comment   Date VIS given to Patient or Parent 10/3/2024    Updated by: Jose Alberto Saldana CMA     Immunization History   Administered Date(s) Administered    Covid-19 Vaccine Pfizer 30 mcg/0.3 ml 07/08/2021, 07/29/2021    DTAP 07/21/2000, 09/05/2000, 11/06/2000, 05/29/2001, 08/15/2005    HEP A,Ped/Adol,(2 Dose) 05/09/2013, 05/23/2014    HEP B 05/02/2000, 07/21/2000, 05/29/2001    HEP B, Ped/Adol 05/02/2000, 07/21/2000    HIB 07/21/2000, 09/05/2000, 11/06/2000, 05/29/2001    HPV (Gardasil) 05/09/2013, 05/23/2014, 05/27/2015    IPV 07/21/2000, 09/05/2000, 09/28/2001, 08/15/2005    Influenza Vaccine, trivalent (IIV3), PF 0.5mL ([id number]) 10/03/2024    MMR 05/29/2001, 08/15/2005    Meningococcal B, Omv 06/04/2018, 07/12/2018    Meningococcal-Menactra 06/01/2011, 06/01/2016    Meningococcal-Menveo 2month-55yr 06/01/2011, 06/01/2016    Pneumococcal (Prevnar 7) 11/06/2000, 02/13/2001, 05/29/2001, 09/28/2001    TDAP 06/01/2011, 12/07/2023    Varicella 09/28/2001, 09/04/2007     Sincerely,    Vicky Galo MD          Document electronically generated by:  Juanita Corey [facility]

## (undated) NOTE — Clinical Note
[facility] [employer] of Child Health Examination       Student's Name  Darren Shipley Birth D Title                           Date    (If adding dates to the above immunization history section, put your initials by date(s) and sign here.)   ALTERNATIVE PROOF OF IMMUNITY   1 Diagnosis of asthma? Child wakes during the night coughing   Yes   No    Yes   No    Loss of function of one of paired organs? (eye/ear/kidney/testicle)   Yes   No      Birth Defects? Developmental delay? Yes   No    Yes   No  Hospitalizations? When? Signs of Insulin Resistance (hypertension, dyslipidemia, polycystic ovarian syndrome, acanthosis nigricans)              No             At Risk      No   Lead Risk Questionnaire  Req'd for children 6 months thru 6 yrs enrolled in licensed or public Needs/Restrictions     None   SPECIAL INSTRUCTIONS/DEVICES e.g. safety glasses, glass eye, chest protector for arrhythmia, pacemaker, prosthetic device, dental bridge, false teeth, athleticsupport/cup     None   MENTAL HEALTH/OTHER   Is there anything else

## (undated) NOTE — LETTER
VACCINE ADMINISTRATION RECORD  PARENT / GUARDIAN APPROVAL  Date: 2018  Vaccine administered to: Adriana Méndez     : 2000    MRN: [id number]    A copy of the appropriate Centers for Disease Control and Prevention Vaccine Information statement

## (undated) NOTE — MR AVS SNAPSHOT
After Visit Summary   10/1/2021    Lizbeth Zamora   MRN: [id number]           Visit Information     Date & Time  10/1/2021  9:30 AM Provider  Corie Jalloh, [address] [facility], [address] [facility],[address], [facility]/[employer].  Phone  [phone] can be paid hassle-free using a credit, debit, or health savings card. Not active on [employer]? Ask us how to get signed up today! If you receive a survey from [employer], please take a few minutes to complete it and provide feedback.  We strive to

## (undated) NOTE — LETTER
2024        Elisabet Mccall        [address] [place] [address]         To Whom It May Concern,    Elisabet Mccall  2000 Immunization record    Immunizations/Injections     Covid-19 Vaccine Pfizer 30 mcg/0.3 ml2021, 2021  DTAP8/15/2005, 2001, 2000,  ... (2 more)  HEP A,Ped/Adol,(2 Dose)2014, 2013  HEP B5, 2000, 2000  HEP B, Ped/Adol2000, 2000  HIB2001, 2000, 2000,  ... (1 more)  HPV (Gardasil)2015, 2014, 2013  IPV8/15/2005, 2001, 2000,  ... (1 more)  Influenza Vaccine, trivalent (IIV3), PF 0.5mL (65084)10/3/2024  MMR8/15/2005, 2001  Meningococcal B, Omv7, 2018  Meningococcal-Menactra6, 2011  Meningococcal-Menveo 2month-55yr2016, 2011  Pneumococcal (Prevnar 7)2001, 2001, 2001,  ... (1 more)  TDAP12, 2011  Varicella2007, 2001      Sincerely,    Vicky Galo MD  [address] [place] [address]  Ph: [phone]  Fax: [phone]        Document electronically generated by:  Scarlett GALVAN RN

## (undated) NOTE — Clinical Note
11/20/2024        Elisabet Mccall        [address] [place] [address]         To Whom It May Concern,      Sincerely,    Vicky Galo MD  [address] [facility] [address]  Ph: [phone]  Fax: [phone]        Document electronically generated by:  Scarlett GALVAN RN

## (undated) NOTE — MR AVS SNAPSHOT
[facility]  [facility], [address]  [phone]               Thank you for choosing us for your health care visit with JOO BARCENAS DO.   We are glad to serve you and happy to provide you with this summary 48-59 lbs               10 ml                        4                              2                       1  60-71 lbs               12.5 ml                     5                              2&1/2  72-95 lbs               15 ml                        6 Today's Vital Signs     BP Pulse Temp Weight          117/76 mmHg 89 98.7 °F (37.1 °C) (Tympanic) 59.535 kg (131 lb 4 oz) (67 %*, Z = 0.44)      *Growth percentiles are based on CDC 2-20 Years data         Current Medications          This list is accurate o 3 servings of low-fat dairy a day  o 2 or less hours of screen time a day  o 1 or more hours of physical activity a day    To help children live healthy active lives, parents can:  o Be role models themselves by making healthy eating and daily physical a

## (undated) NOTE — LETTER
EMERGENCY INFORMATION POCKET CARD    Name:  Elisabet Mccall         YOB: 2000      Emergency Contacts:    Name Relationship Lgl Grd Work Phone Home Phone Mobile Phone   1. RIGO MCCALL Mother   [phone]    2. JENNIFER MCCALL Father   [phone]       Primary Care Physician:  Vicky Galo MD  Address:  [address] [place] [address]  Phone:  [phone]   Immunization History   Administered Date(s) Administered    Covid-19 Vaccine Pfizer 30 mcg/0.3 ml 07/08/2021, 07/29/2021    DTAP 07/21/2000, 09/05/2000, 11/06/2000, 05/29/2001, 08/15/2005    HEP A,Ped/Adol,(2 Dose) 05/09/2013, 05/23/2014    HEP B 05/02/2000, 07/21/2000, 05/29/2001    HEP B, Ped/Adol 05/02/2000, 07/21/2000    HIB 07/21/2000, 09/05/2000, 11/06/2000, 05/29/2001    HPV (Gardasil) 05/09/2013, 05/23/2014, 05/27/2015    IPV 07/21/2000, 09/05/2000, 09/28/2001, 08/15/2005    Influenza Vaccine, trivalent (IIV3), PF 0.5mL (55727) 10/03/2024    MMR 05/29/2001, 08/15/2005    Meningococcal B, Omv 06/04/2018, 07/12/2018    Meningococcal-Menactra 06/01/2011, 06/01/2016    Meningococcal-Menveo 2month-55yr 06/01/2011, 06/01/2016    Pneumococcal (Prevnar 7) 11/06/2000, 02/13/2001, 05/29/2001, 09/28/2001    TDAP 06/01/2011, 12/07/2023    Varicella 09/28/2001, 09/04/2007      Pharmacy:    [id number] - [place], [place] - [address] AT [facility], [phone], [phone]  [address] [place] [address]  Phone: [phone] Fax: [phone]    [id number] - [place], [place] - [address] AT [facility], [phone], [phone]  [address] [place] [place] [address]  Phone: [phone] Fax: [phone]     Current Outpatient Medications   Medication Sig Dispense Refill    ergocalciferol 1.25 MG (86344 UT) Oral Cap Take 1 capsule (50,000 Units total) by mouth once a week. 4 capsule 3    Norethin-Eth Estrad-Fe Biphas (LO LOESTRIN FE) 1 MG-10 MCG / 10  MCG Oral Tab Take 1 tablet by mouth daily. 84 tablet 3    tretinoin 0.025 % External Cream APPLY PEA-SIZED AMOUNT TO THE FACE EVERY NIGHT AT BEDTIME

## (undated) NOTE — LETTER
07/11/19        Migdlaia WARE[facility] [address]      Dear Renetta Leon records indicate that you have outstanding lab work and or testing that was ordered for you and has not yet been completed:  Orders Placed This Encounter

## (undated) NOTE — LETTER
[facility] [employer] of Child Health Examination       Student's Name  Rene RODRIGUEZ Title            DO               Date  06/04/18   Signature HEALTH HISTORY          TO BE COMPLETED AND SIGNED BY PARENT/GUARDIAN AND VERIFIED BY HEALTH CARE PROVIDER    ALLERGIES  (Food, drug, insect, other) MEDICATION  (List all prescribed or taken on a regular basis.)     Diagnosis of asthma?   Child wakes during BMI 25.33 kg/m²     DIABETES SCREENING  BMI>85% age/sex  No And any two of the following:  Family History No   Ethnic Minority  No          Signs of Insulin Resistance (hypertension, dyslipidemia, polycystic ovarian syndrome, acanthosis nigricans)    No Quick-relief  medication (e.g. Short Acting Beta Antagonist): No          Controller medication (e.g. inhaled corticosteroid):   No Other   NEEDS/MODIFICATIONS required in the school setting  None DIETARY Needs/Restrictions     None   SPECIAL INSTR